# Patient Record
Sex: MALE | URBAN - METROPOLITAN AREA
[De-identification: names, ages, dates, MRNs, and addresses within clinical notes are randomized per-mention and may not be internally consistent; named-entity substitution may affect disease eponyms.]

---

## 2022-10-05 ENCOUNTER — OFFICE VISIT (OUTPATIENT)
Dept: URBAN - METROPOLITAN AREA CLINIC 43 | Facility: CLINIC | Age: 77
End: 2022-10-05
Payer: COMMERCIAL

## 2022-10-05 DIAGNOSIS — H25.13 AGE-RELATED NUCLEAR CATARACT, BILATERAL: ICD-10-CM

## 2022-10-05 DIAGNOSIS — H40.033 ANATOMICAL NARROW ANGLE, BILATERAL: Primary | ICD-10-CM

## 2022-10-05 PROCEDURE — 92020 GONIOSCOPY: CPT | Performed by: OPTOMETRIST

## 2022-10-05 PROCEDURE — 99203 OFFICE O/P NEW LOW 30 MIN: CPT | Performed by: OPTOMETRIST

## 2022-10-05 ASSESSMENT — KERATOMETRY
OS: 42.50
OD: 43.00

## 2022-10-05 ASSESSMENT — VISUAL ACUITY
OD: 20/20
OS: 20/20

## 2022-10-05 ASSESSMENT — INTRAOCULAR PRESSURE
OD: 13
OS: 13

## 2022-10-05 NOTE — IMPRESSION/PLAN
Impression: Anatomical narrow angle, bilateral: H40.033.  Plan: occludable OU with gonio, IOP low, moderate cupping OU
refer for LPI consult, then back for CE

## 2022-10-05 NOTE — IMPRESSION/PLAN
Impression: Age-related nuclear cataract, bilateral: H25.13.  Plan: good BCVA, will re-examine at  after LPI consult OU